# Patient Record
(demographics unavailable — no encounter records)

---

## 2025-01-24 NOTE — PHYSICAL EXAM
[de-identified] : Constitutional: The general appearance of the patient is well developed, well nourished, no deformities and well groomed. Normal   Gait: Gait and function is as follows: normal gait.   Skin: Head and neck visualized skin is normal. Left upper extremity visualized skin is normal. Right upper extremity visualized skin is normal. Thoracic Skin of the thoracic spine shows visualized skin is normal.   Cardiovascular: palpable radial pulse bilaterally, good capillary refill in digits of the bilateral upper extremities and no temperature or color changes in the bilateral upper extremities.   Lymphatic: Normal Palpation of lymph nodes in the cervical.   Neurologic: fine motor control in the bilateral upper extremities is intact. Deep Tendon Reflexes in Upper and Lower Extremities Negative Salazar's in the bilateral upper extremities. The patient is oriented to time, place and person. Sensation to light touch intact in the bilateral upper extremities. Mood and Affect is normal.   Right Shoulder: Inspection of the shoulder/upper arm is as follows: Stable shoulder. Palpation of the shoulder/upper arm is as follows: There is tenderness at the AC joint, proximal biceps tendon and supraspinatus tendon. Range of motion of the shoulder is as follows: Pain with internal rotation, external rotation, abduction and forward flexion. Strength of the shoulder is as follows: Supraspinatus 4/5. External Rotation 4/5. Internal Rotation 4/5. Infraspinatus 5/5 4/5. Deltoid 5/5 Ligament Stability and Special Tests of the shoulder is as follows: Neer test is positive. Boggs' test is positive. Speed's test is positive.   Left Shoulder: Inspection of the shoulder/upper arm is as follows: Stable shoulder. Palpation of the shoulder/upper arm is as follows: There is tenderness at the AC joint, proximal biceps tendon and supraspinatus tendon. Range of motion of the shoulder is as follows: Pain with internal rotation, external rotation, abduction and forward flexion. Strength of the shoulder is as follows: Supraspinatus 4/5. External Rotation 4/5. Internal Rotation 4/5. Infraspinatus 5/5 4/5. Deltoid 5/5 Ligament Stability and Special Tests of the shoulder is as follows: Neer test is positive. Boggs' test is positive. Speed's test is positive.   Neck: Inspection / Palpation of the cervical spine is as follows: There is a moderately restricted ROM.  stable range of motion of the cervical spine with increase tone and tenderness to palpation to b/l paracervical muscles.    Back, including spine: Inspection / Palpation of the thoracic/lumbar spine is as follows: There is a full, pain free, stable range of motion of the thoracic spine with a normal tone and not tenderness to palpation..

## 2025-01-24 NOTE — DATA REVIEWED
[FreeTextEntry1] : MRI left shoulder [facility] 6/17/24 High grade partial tear of the supraspinatus tendon Biceps tenosynovitis
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
## 2025-06-13 NOTE — PHYSICAL EXAM
[de-identified] : Constitutional: - No acute distress - Well developed; well nourished   Neurological: - normal mood and affect - alert and oriented x 3   Cardiovascular: - grossly normal  Lumbar Spine Exam:   Inspection: erythema (-) ecchymosis (-) rashes (-) alignment: no scoliosis   Palpation: Midline lumbar tenderness:            (-) midline thoracic tenderness:          (-) Lumbar paraspinal tenderness:      L (-); R (-) thoracic paraspinal tenderness:     L (-); R (-) sciatic nerve tenderness :              L (-); R (-) SI joint tenderness:                        L (-); R (-) GTB tenderness:                            L (-); R (-)   ROM:  WNL   Strength:                                    Right       Left  Hip Flexion:                (5/5)       (5/5) Quadriceps:               (5/5)       (5/5) Hamstrings:                (5/5)       (5/5) Ankle Dorsiflexion:     (5/5)       (5/5) EHL:                           (5/5)       (5/5) Ankle Plantarflexion:  (5/5)       (5/5)   Special Tests: SLR:                           R (-) ; L (-) Facet loading:            R (-) ; L (-) GABRIELLE test:               R (n/a) ; L (n/a) Hamstring tightness:  R (-);  L (-)   Neurologic: SI LT throughout right lower extremity SI LT throughout left lower extremity   Reflexes normal and symmetric bilateral lower extremities   Gait: non- antalgic gait ambulates without assistive device

## 2025-06-13 NOTE — PHYSICAL EXAM
[de-identified] : Constitutional: - No acute distress - Well developed; well nourished   Neurological: - normal mood and affect - alert and oriented x 3   Cardiovascular: - grossly normal  Lumbar Spine Exam:   Inspection: erythema (-) ecchymosis (-) rashes (-) alignment: no scoliosis   Palpation: Midline lumbar tenderness:            (-) midline thoracic tenderness:          (-) Lumbar paraspinal tenderness:      L (-); R (-) thoracic paraspinal tenderness:     L (-); R (-) sciatic nerve tenderness :              L (-); R (-) SI joint tenderness:                        L (-); R (-) GTB tenderness:                            L (-); R (-)   ROM:  WNL   Strength:                                    Right       Left  Hip Flexion:                (5/5)       (5/5) Quadriceps:               (5/5)       (5/5) Hamstrings:                (5/5)       (5/5) Ankle Dorsiflexion:     (5/5)       (5/5) EHL:                           (5/5)       (5/5) Ankle Plantarflexion:  (5/5)       (5/5)   Special Tests: SLR:                           R (-) ; L (-) Facet loading:            R (-) ; L (-) GABRIELLE test:               R (n/a) ; L (n/a) Hamstring tightness:  R (-);  L (-)   Neurologic: SI LT throughout right lower extremity SI LT throughout left lower extremity   Reflexes normal and symmetric bilateral lower extremities   Gait: non- antalgic gait ambulates without assistive device

## 2025-06-13 NOTE — ASSESSMENT
[FreeTextEntry1] : We discussed the nature of the underlying pathology and available pain management treatment options. These included interventional, rehabilitative, pharmacological, and complementary modalities. We will proceed with the following:    Interventional treatment options: - Proceed with _ with fluoroscopic guidance - None indicated at present time  - see additional instructions below    Rehabilitative options: - Initiate trial of physical therapy - Continue physical therapy - Participation in active HEP was discussed and encouraged as tolerated - Home exercise sheet provided   Medication based treatment options: - Initiate trial of _ - Continue _ - See additional instructions below    Complementary treatment options: - Weight management and lifestyle modifications discussed   Additional treatment recommendations as follows: - patient will follow up _
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
## 2025-07-01 NOTE — DISCUSSION/SUMMARY
[de-identified] : Lumbar radiculopathy, severe L5/S1 disc space narrowing, also with R hip OA  Symptoms have improved significantly with physical therapy.  She only has mild occasional radicular pain now however physical therapy has made a big difference for her.  She saw pain management who offered her a lumbar epidural steroid injection which she deferred.  She requested a new physical therapy prescription today which was given to her.  Will see her back on an as-needed basis if or when her symptoms worsen.  All of her questions were answered she is in agreement with this plan.

## 2025-07-01 NOTE — IMAGING
[de-identified] :  A&Ox3, NAD Gait non-antalgic    Spine Exam:  ROM  Flexion - limited 2/2 pain  Extension - limited 2/2 pain  Sidebending L and R-  limited 2/2 pain    Midline tenderness - no Paraspinal tenderness/spasm - yes     L Side Strength: Hip flexion - 5  Hip abduction - 5 Knee extension - 5 Knee flexion - 5 DF- 5 PF - 5 EHL - 5    Straight leg raise test - pos for back pain, no leg pain Cx Straight leg raise test - neg    R Side Strength:  Hip flexion - 5 Hip abduction - 5 Knee extension - 5 Knee flexion - 5 DF- 5 PF - 5 EHL - 5   Straight leg raise test - pos for back pain, no leg pain Cx Straight leg raise test - neg     Sensation: Intact L5-S1 bilaterally - yes    Reflexes: Patellar/Achilles reflex intact bilaterally    DP/PT 2+  Gait: Non-antalgic Alignment: Neutral Scars: None   R Knee: Tenderness: hamstring/gastroc tendons ROM: 0-120 degrees Crepitus: None Effusion: None Warmth: None   Meniscal Signs: Pain on terminal extension: None Pain on terminal flexion: None McMurrays: Neg Thessaly's: Neg   Ligament Exam: Lachman: neg Post Drawer: neg Valgus stress: neg at 0 and 30 degrees Varus stress: neg at 0 and 30 degrees Pivot shift: neg Dial test: neg at 30 degrees, neg at 90 degrees   Strength: Quads: 5/5 Hamstrin/5 DF/PF/EHL 5/5   Sensation grossly intact in all dermatomes, DP/PT 2+, brisk capillary refill distally